# Patient Record
Sex: FEMALE | Race: AMERICAN INDIAN OR ALASKA NATIVE
[De-identification: names, ages, dates, MRNs, and addresses within clinical notes are randomized per-mention and may not be internally consistent; named-entity substitution may affect disease eponyms.]

---

## 2019-11-12 ENCOUNTER — HOSPITAL ENCOUNTER (EMERGENCY)
Dept: HOSPITAL 5 - ED | Age: 24
Discharge: LEFT BEFORE DISCHARGE | End: 2019-11-12
Payer: SELF-PAY

## 2019-11-12 VITALS — SYSTOLIC BLOOD PRESSURE: 149 MMHG | DIASTOLIC BLOOD PRESSURE: 76 MMHG

## 2019-11-12 DIAGNOSIS — R10.9: Primary | ICD-10-CM

## 2019-11-12 DIAGNOSIS — Z53.21: ICD-10-CM

## 2022-04-18 NOTE — XRAY REPORT
LEFT KNEE 4 VIEW(S)



INDICATION / CLINICAL INFORMATION: kneee pain left 



COMPARISON: None available.

 

FINDINGS:



BONES / JOINT(S): No acute fracture or subluxation. No significant arthritis.



SOFT TISSUES: No significant abnormality.



ADDITIONAL FINDINGS: None.



IMPRESSION:

1.No acute findings. No significant abnormality.



Signer Name: Waqas Ding II, MD 

Signed: 4/18/2022 7:24 AM

Workstation Name: [id number]
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
## 2022-04-18 NOTE — XRAY REPORT
LEFT HIP 2 VIEWS



INDICATION:  Left hip pain. 



COMPARISON: None.



IMPRESSION:  No acute osseous or soft tissue abnormality.    No significant DJD.



Signer Name: Orville Canchola Jr, MD 

Signed: 4/18/2022 7:36 AM

Workstation Name: [id number]